# Patient Record
Sex: MALE | Race: BLACK OR AFRICAN AMERICAN
[De-identification: names, ages, dates, MRNs, and addresses within clinical notes are randomized per-mention and may not be internally consistent; named-entity substitution may affect disease eponyms.]

---

## 2018-05-12 ENCOUNTER — HOSPITAL ENCOUNTER (INPATIENT)
Dept: HOSPITAL 62 - ER | Age: 48
LOS: 6 days | Discharge: HOME | DRG: 872 | End: 2018-05-18
Attending: INTERNAL MEDICINE | Admitting: INTERNAL MEDICINE
Payer: SELF-PAY

## 2018-05-12 DIAGNOSIS — E86.9: ICD-10-CM

## 2018-05-12 DIAGNOSIS — E87.6: ICD-10-CM

## 2018-05-12 DIAGNOSIS — D72.825: ICD-10-CM

## 2018-05-12 DIAGNOSIS — K52.9: ICD-10-CM

## 2018-05-12 DIAGNOSIS — E66.9: ICD-10-CM

## 2018-05-12 DIAGNOSIS — E87.3: ICD-10-CM

## 2018-05-12 DIAGNOSIS — I16.9: ICD-10-CM

## 2018-05-12 DIAGNOSIS — Z82.49: ICD-10-CM

## 2018-05-12 DIAGNOSIS — E86.0: ICD-10-CM

## 2018-05-12 DIAGNOSIS — R31.9: ICD-10-CM

## 2018-05-12 DIAGNOSIS — D50.9: ICD-10-CM

## 2018-05-12 DIAGNOSIS — E87.1: ICD-10-CM

## 2018-05-12 DIAGNOSIS — D72.829: ICD-10-CM

## 2018-05-12 DIAGNOSIS — A41.9: Primary | ICD-10-CM

## 2018-05-12 PROCEDURE — 83605 ASSAY OF LACTIC ACID: CPT

## 2018-05-12 PROCEDURE — 83735 ASSAY OF MAGNESIUM: CPT

## 2018-05-12 PROCEDURE — 80048 BASIC METABOLIC PNL TOTAL CA: CPT

## 2018-05-12 PROCEDURE — 83880 ASSAY OF NATRIURETIC PEPTIDE: CPT

## 2018-05-12 PROCEDURE — 96375 TX/PRO/DX INJ NEW DRUG ADDON: CPT

## 2018-05-12 PROCEDURE — 80053 COMPREHEN METABOLIC PANEL: CPT

## 2018-05-12 PROCEDURE — 93005 ELECTROCARDIOGRAM TRACING: CPT

## 2018-05-12 PROCEDURE — S0028 INJECTION, FAMOTIDINE, 20 MG: HCPCS

## 2018-05-12 PROCEDURE — 96361 HYDRATE IV INFUSION ADD-ON: CPT

## 2018-05-12 PROCEDURE — 93010 ELECTROCARDIOGRAM REPORT: CPT

## 2018-05-12 PROCEDURE — 80069 RENAL FUNCTION PANEL: CPT

## 2018-05-12 PROCEDURE — 85025 COMPLETE CBC W/AUTO DIFF WBC: CPT

## 2018-05-12 PROCEDURE — 81001 URINALYSIS AUTO W/SCOPE: CPT

## 2018-05-12 PROCEDURE — 87205 SMEAR GRAM STAIN: CPT

## 2018-05-12 PROCEDURE — 84443 ASSAY THYROID STIM HORMONE: CPT

## 2018-05-12 PROCEDURE — 87045 FECES CULTURE AEROBIC BACT: CPT

## 2018-05-12 PROCEDURE — 96365 THER/PROPH/DIAG IV INF INIT: CPT

## 2018-05-12 PROCEDURE — 82803 BLOOD GASES ANY COMBINATION: CPT

## 2018-05-12 PROCEDURE — 87493 C DIFF AMPLIFIED PROBE: CPT

## 2018-05-12 PROCEDURE — 36415 COLL VENOUS BLD VENIPUNCTURE: CPT

## 2018-05-12 PROCEDURE — 84100 ASSAY OF PHOSPHORUS: CPT

## 2018-05-12 PROCEDURE — 99285 EMERGENCY DEPT VISIT HI MDM: CPT

## 2018-05-12 PROCEDURE — 83690 ASSAY OF LIPASE: CPT

## 2018-05-12 PROCEDURE — 80202 ASSAY OF VANCOMYCIN: CPT

## 2018-05-12 PROCEDURE — 83540 ASSAY OF IRON: CPT

## 2018-05-12 PROCEDURE — 74019 RADEX ABDOMEN 2 VIEWS: CPT

## 2018-05-12 PROCEDURE — 87040 BLOOD CULTURE FOR BACTERIA: CPT

## 2018-05-13 LAB
%HYPO/RBC NFR BLD AUTO: (no result) %
%HYPO/RBC NFR BLD AUTO: SLIGHT %
ABSOLUTE LYMPHOCYTES# (MANUAL): 1.1 10^3/UL (ref 0.5–4.7)
ABSOLUTE LYMPHOCYTES# (MANUAL): 2.3 10^3/UL (ref 0.5–4.7)
ABSOLUTE MONOCYTES # (MANUAL): 0.2 10^3/UL (ref 0.1–1.4)
ABSOLUTE MONOCYTES # (MANUAL): 0.6 10^3/UL (ref 0.1–1.4)
ABSOLUTE NEUTROPHILS# (MANUAL): 12.3 10^3/UL (ref 1.7–8.2)
ABSOLUTE NEUTROPHILS# (MANUAL): 13.9 10^3/UL (ref 1.7–8.2)
ADD MANUAL DIFF: YES
ADD MANUAL DIFF: YES
ALBUMIN SERPL-MCNC: 3.4 G/DL (ref 3.5–5)
ALBUMIN SERPL-MCNC: 3.7 G/DL (ref 3.5–5)
ALP SERPL-CCNC: 74 U/L (ref 38–126)
ALP SERPL-CCNC: 83 U/L (ref 38–126)
ALT SERPL-CCNC: 100 U/L (ref 21–72)
ALT SERPL-CCNC: 87 U/L (ref 21–72)
ANION GAP SERPL CALC-SCNC: 10 MMOL/L (ref 5–19)
ANION GAP SERPL CALC-SCNC: 12 MMOL/L (ref 5–19)
ANION GAP SERPL CALC-SCNC: 17 MMOL/L (ref 5–19)
ANISOCYTOSIS BLD QL SMEAR: (no result)
ANISOCYTOSIS BLD QL SMEAR: SLIGHT
APPEARANCE UR: (no result)
APTT PPP: YELLOW S
AST SERPL-CCNC: 225 U/L (ref 17–59)
AST SERPL-CCNC: 262 U/L (ref 17–59)
BASE EXCESS BLDV CALC-SCNC: 9 MMOL/L
BASOPHILS NFR BLD MANUAL: 0 % (ref 0–2)
BASOPHILS NFR BLD MANUAL: 0 % (ref 0–2)
BILIRUB DIRECT SERPL-MCNC: 0.7 MG/DL (ref 0–0.4)
BILIRUB DIRECT SERPL-MCNC: 0.7 MG/DL (ref 0–0.4)
BILIRUB SERPL-MCNC: 0.9 MG/DL (ref 0.2–1.3)
BILIRUB SERPL-MCNC: 1.1 MG/DL (ref 0.2–1.3)
BILIRUB UR QL STRIP: NEGATIVE
BUN SERPL-MCNC: 12 MG/DL (ref 7–20)
CALCIUM: 7.8 MG/DL (ref 8.4–10.2)
CALCIUM: 8.2 MG/DL (ref 8.4–10.2)
CALCIUM: 8.4 MG/DL (ref 8.4–10.2)
CHLORIDE SERPL-SCNC: 82 MMOL/L (ref 98–107)
CHLORIDE SERPL-SCNC: 92 MMOL/L (ref 98–107)
CHLORIDE SERPL-SCNC: 98 MMOL/L (ref 98–107)
CO2 SERPL-SCNC: 31 MMOL/L (ref 22–30)
CO2 SERPL-SCNC: 32 MMOL/L (ref 22–30)
CO2 SERPL-SCNC: 32 MMOL/L (ref 22–30)
DOHLE BOD BLD QL SMEAR: PRESENT
EOSINOPHIL NFR BLD MANUAL: 0 % (ref 0–6)
EOSINOPHIL NFR BLD MANUAL: 0 % (ref 0–6)
ERYTHROCYTE [DISTWIDTH] IN BLOOD BY AUTOMATED COUNT: 15.5 % (ref 11.5–14)
ERYTHROCYTE [DISTWIDTH] IN BLOOD BY AUTOMATED COUNT: 15.6 % (ref 11.5–14)
GLUCOSE SERPL-MCNC: 107 MG/DL (ref 75–110)
GLUCOSE SERPL-MCNC: 121 MG/DL (ref 75–110)
GLUCOSE SERPL-MCNC: 132 MG/DL (ref 75–110)
GLUCOSE UR STRIP-MCNC: NEGATIVE MG/DL
HCO3 BLDV-SCNC: 33.4 MMOL/L (ref 20–32)
HCT VFR BLD CALC: 33.7 % (ref 37.9–51)
HCT VFR BLD CALC: 36.1 % (ref 37.9–51)
HGB BLD-MCNC: 11 G/DL (ref 13.5–17)
HGB BLD-MCNC: 11.6 G/DL (ref 13.5–17)
KETONES UR STRIP-MCNC: (no result) MG/DL
LIPASE SERPL-CCNC: 90.3 U/L (ref 23–300)
MCH RBC QN AUTO: 20.3 PG (ref 27–33.4)
MCH RBC QN AUTO: 20.5 PG (ref 27–33.4)
MCHC RBC AUTO-ENTMCNC: 32.1 G/DL (ref 32–36)
MCHC RBC AUTO-ENTMCNC: 32.6 G/DL (ref 32–36)
MCV RBC AUTO: 63 FL (ref 80–97)
MCV RBC AUTO: 63 FL (ref 80–97)
METAMYELOCYTES % (MANUAL): 2 %
MONOCYTES % (MANUAL): 1 % (ref 3–13)
MONOCYTES % (MANUAL): 4 % (ref 3–13)
NEUTS BAND NFR BLD MANUAL: 19 % (ref 3–5)
NEUTS BAND NFR BLD MANUAL: 26 % (ref 3–5)
NITRITE UR QL STRIP: NEGATIVE
PCO2 BLDV: 44.8 MMHG (ref 35–63)
PH BLDV: 7.49 [PH] (ref 7.3–7.42)
PH UR STRIP: 6 [PH] (ref 5–9)
PHOSPHATE SERPL-MCNC: 2.2 MG/DL (ref 2.5–4.5)
PLATELET # BLD: 150 10^3/UL (ref 150–450)
PLATELET # BLD: 157 10^3/UL (ref 150–450)
PLATELET COMMENT: ADEQUATE
PLATELET COMMENT: ADEQUATE
PLATELET LARGE: PRESENT
POIKILOCYTOSIS BLD QL SMEAR: SLIGHT
POIKILOCYTOSIS BLD QL SMEAR: SLIGHT
POTASSIUM SERPL-SCNC: 3 MMOL/L (ref 3.6–5)
POTASSIUM SERPL-SCNC: 3.3 MMOL/L (ref 3.6–5)
POTASSIUM SERPL-SCNC: 3.7 MMOL/L (ref 3.6–5)
PROT SERPL-MCNC: 7.3 G/DL (ref 6.3–8.2)
PROT SERPL-MCNC: 7.5 G/DL (ref 6.3–8.2)
PROT UR STRIP-MCNC: 100 MG/DL
RBC # BLD AUTO: 5.34 10^6/UL (ref 4.35–5.55)
RBC # BLD AUTO: 5.7 10^6/UL (ref 4.35–5.55)
SCHISTOCYTES BLD QL SMEAR: SLIGHT
SEGMENTED NEUTROPHILS % (MAN): 57 % (ref 42–78)
SEGMENTED NEUTROPHILS % (MAN): 69 % (ref 42–78)
SODIUM SERPL-SCNC: 129.7 MMOL/L (ref 137–145)
SODIUM SERPL-SCNC: 135.5 MMOL/L (ref 137–145)
SODIUM SERPL-SCNC: 140 MMOL/L (ref 137–145)
SP GR UR STRIP: 1.01
TARGETS BLD QL SMEAR: SLIGHT
TOTAL CELLS COUNTED BLD: 100
TOTAL CELLS COUNTED BLD: 100
TOXIC GRANULES BLD QL SMEAR: SLIGHT
UROBILINOGEN UR-MCNC: NEGATIVE MG/DL (ref ?–2)
VARIANT LYMPHS NFR BLD MANUAL: 13 % (ref 13–45)
VARIANT LYMPHS NFR BLD MANUAL: 8 % (ref 13–45)
WBC # BLD AUTO: 14 10^3/UL (ref 4–10.5)
WBC # BLD AUTO: 16.3 10^3/UL (ref 4–10.5)
WBC TOXIC VACUOLES BLD QL SMEAR: PRESENT
WBC TOXIC VACUOLES BLD QL SMEAR: PRESENT

## 2018-05-13 RX ADMIN — POTASSIUM CHLORIDE SCH ML: 29.8 INJECTION, SOLUTION INTRAVENOUS at 07:53

## 2018-05-13 RX ADMIN — METRONIDAZOLE SCH MG: 500 TABLET ORAL at 17:12

## 2018-05-13 RX ADMIN — POTASSIUM CHLORIDE SCH ML: 29.8 INJECTION, SOLUTION INTRAVENOUS at 12:01

## 2018-05-13 RX ADMIN — HEPARIN SODIUM SCH UNIT: 5000 INJECTION, SOLUTION INTRAVENOUS; SUBCUTANEOUS at 15:30

## 2018-05-13 RX ADMIN — METRONIDAZOLE SCH MG: 500 TABLET ORAL at 06:27

## 2018-05-13 RX ADMIN — MAGNESIUM SULFATE IN DEXTROSE SCH ML: 10 INJECTION, SOLUTION INTRAVENOUS at 05:00

## 2018-05-13 RX ADMIN — MAGNESIUM SULFATE IN DEXTROSE SCH ML: 10 INJECTION, SOLUTION INTRAVENOUS at 03:35

## 2018-05-13 RX ADMIN — CIPROFLOXACIN SCH ML: 2 INJECTION, SOLUTION INTRAVENOUS at 21:16

## 2018-05-13 RX ADMIN — SODIUM CHLORIDE SCH ML: 9 INJECTION, SOLUTION INTRAVENOUS at 07:54

## 2018-05-13 RX ADMIN — MAGNESIUM SULFATE IN DEXTROSE SCH ML: 10 INJECTION, SOLUTION INTRAVENOUS at 09:26

## 2018-05-13 RX ADMIN — IBUPROFEN PRN MG: 400 TABLET ORAL at 12:18

## 2018-05-13 RX ADMIN — HEPARIN SODIUM SCH UNIT: 5000 INJECTION, SOLUTION INTRAVENOUS; SUBCUTANEOUS at 06:34

## 2018-05-13 RX ADMIN — ACETAMINOPHEN PRN MG: 325 TABLET ORAL at 11:05

## 2018-05-13 RX ADMIN — POTASSIUM CHLORIDE SCH ML: 29.8 INJECTION, SOLUTION INTRAVENOUS at 05:01

## 2018-05-13 RX ADMIN — FAMOTIDINE SCH MG: 10 INJECTION INTRAVENOUS at 21:16

## 2018-05-13 RX ADMIN — SODIUM CHLORIDE PRN ML: 9 INJECTION, SOLUTION INTRAVENOUS at 23:31

## 2018-05-13 RX ADMIN — ENALAPRILAT PRN MG: 1.25 INJECTION, SOLUTION INTRAVENOUS at 06:27

## 2018-05-13 RX ADMIN — POTASSIUM CHLORIDE SCH ML: 29.8 INJECTION, SOLUTION INTRAVENOUS at 08:00

## 2018-05-13 RX ADMIN — HEPARIN SODIUM SCH UNIT: 5000 INJECTION, SOLUTION INTRAVENOUS; SUBCUTANEOUS at 21:16

## 2018-05-13 RX ADMIN — ENALAPRILAT PRN MG: 1.25 INJECTION, SOLUTION INTRAVENOUS at 23:35

## 2018-05-13 RX ADMIN — SODIUM CHLORIDE SCH ML: 9 INJECTION, SOLUTION INTRAVENOUS at 13:10

## 2018-05-13 RX ADMIN — MAGNESIUM SULFATE IN DEXTROSE SCH ML: 10 INJECTION, SOLUTION INTRAVENOUS at 11:43

## 2018-05-13 RX ADMIN — METRONIDAZOLE SCH MG: 500 TABLET ORAL at 23:30

## 2018-05-13 RX ADMIN — SODIUM CHLORIDE SCH ML: 9 INJECTION, SOLUTION INTRAVENOUS at 05:01

## 2018-05-13 RX ADMIN — HYDRALAZINE HYDROCHLORIDE PRN MG: 20 INJECTION INTRAMUSCULAR; INTRAVENOUS at 20:21

## 2018-05-13 RX ADMIN — METRONIDAZOLE SCH MG: 500 TABLET ORAL at 12:03

## 2018-05-13 RX ADMIN — CIPROFLOXACIN SCH ML: 2 INJECTION, SOLUTION INTRAVENOUS at 10:51

## 2018-05-13 RX ADMIN — SODIUM CHLORIDE PRN ML: 9 INJECTION, SOLUTION INTRAVENOUS at 15:44

## 2018-05-13 RX ADMIN — HYDRALAZINE HYDROCHLORIDE PRN MG: 20 INJECTION INTRAMUSCULAR; INTRAVENOUS at 10:51

## 2018-05-13 NOTE — EKG REPORT
SEVERITY:- ABNORMAL ECG -

SINUS TACHYCARDIA

PROBABLE LEFT ATRIAL ABNORMALITY

INCOMPLETE RIGHT BUNDLE BRANCH BLOCK

PROLONG QT.

:

Confirmed by: Doni Cole MD 13-May-2018 07:39:01

## 2018-05-13 NOTE — ER DOCUMENT REPORT
ED General





- General


Chief Complaint: Nausea/Vomiting/Diarrhea


Stated Complaint: NOT FEELING GOOD


Time Seen by Provider: 05/12/18 23:21


Notes: 





Patient is a 47-year-old male without chronic medical problems, no prior 

surgical history who presents with 4 days of nausea, vomiting and diarrhea.  

The patient reports that he is only been able to tolerate small amount of water 

for the past 4 days but has been unable to tolerate any additional fluid intake 

or food intake.  He notes that he vomits anytime even at the smell of food.  He 

also notes persistent, watery diarrhea.  Patient was unaware that he has been 

having fevers but states that he had did feel quite hot at home.  No known sick 

contacts.  He denies a history of similar symptoms in the past.  He has not 

seen his primary care doctor regarding today's concerns.  He denies any 

abdominal pain at time of presentation but notes that he intermittently has 

some abdominal cramping which is mild in nature around episodes of diarrhea 

that resolves after he has diarrhea.  He denies any recent antibiotic use.  No 

bloody diarrhea.


TRAVEL OUTSIDE OF THE [place] IN LAST 30 DAYS: No





- Related Data


Allergies/Adverse Reactions: 


 





No Known Allergies Allergy (Verified 05/12/18 23:01)


 











Past Medical History





- General


Information source: Patient





- Social History


Smoking Status: Never Smoker


Frequency of alcohol use: None


Drug Abuse: None


Lives with: Family


Family History: Reviewed & Not Pertinent





Review of Systems





- Review of Systems


Notes: 





Constitutional: Positive for fever.


HENT: Negative for sore throat.


Eyes: Negative for visual changes.


Cardiovascular: Negative for chest pain.


Respiratory: Negative for shortness of breath.


Gastrointestinal: Negative for abdominal pain, positive for vomiting and 

diarrhea


Genitourinary: Negative for dysuria.


Musculoskeletal: Negative for back pain.


Skin: Negative for rash.


Neurological: Negative for headaches, weakness or numbness.





10 point ROS negative except as marked above and in HPI.





Physical Exam





- Vital signs


Vitals: 


 











Temp Pulse Resp BP Pulse Ox


 


 103.2 F H  102 H  20   200/108 H  95 


 


 05/12/18 23:09  05/12/18 23:09  05/12/18 23:09  05/12/18 23:09  05/12/18 23:09











Interpretation: Hypertensive, Tachycardic, Febrile


Notes: 





PHYSICAL EXAMINATION:





GENERAL: Appears moderately unwell but in no acute distress





HEAD: Atraumatic, normocephalic.





EYES: Pupils equal round and reactive to light, extraocular movements intact, 

sclera anicteric, conjunctiva are normal.





ENT: nares patent, oropharynx clear without exudates.  Very dry mucous 

membranes.





NECK: Normal range of motion, supple without lymphadenopathy





LUNGS: Breath sounds clear to auscultation bilaterally and equal.  No wheezes 

rales or rhonchi.





HEART: Regular tachycardia without murmurs





ABDOMEN: Soft, nontender, normoactive bowel sounds.  No guarding, no rebound.  

No masses appreciated.





EXTREMITIES: Normal range of motion, no pitting or edema.  No cyanosis.





NEUROLOGICAL: No focal neurological deficits. Moves all extremities 

spontaneously and on command.





PSYCH: Normal mood, normal affect.





SKIN: Warm, Dry, normal turgor, no rashes or lesions noted.





Course





- Re-evaluation


Re-evalutation: 





05/13/18 00:12


Patient presents with 4 days of nausea, vomiting and diarrhea but denies any 

abdominal pain.  Patient was febrile in triage with mild tachycardia.  On 

examination he is markedly dehydrated.  His abdominal exam is otherwise benign 

without any focal areas of tenderness, rebound or guarding.  Given the absence 

of any abdominal pain I doubt an acute diverticulitis, acute appendicitis, 

biliary pathology or acute pancreatitis.  Patient denies any dysuria and has no 

CVA tenderness to suggest nephrolithiasis or pyelonephritis.  Will obtain labs, 

provide IV fluids, antiemetics and reassess the patient.  I suspect likely an 

infectious colitis that required antibiotic treatment given that the patient is 

febrile and has been having ongoing symptoms for almost 1 week at this time 

point.


05/13/18 02:30


Labs show a leukocytosis with a markedly elevated bandemia.  Also shows 

hyponatremia and hypokalemia.  Venous blood gas shows that the patient has a 

metabolic alkalosis.  This is very consistent with patient's clinical history 

of persistent vomiting and diarrhea.  Repeat abdominal examination does not 

show any areas of localized tenderness, rebound or guarding.  I discussed with 

the hospitalist for admission.  The patient has been started on IV 

ciprofloxacin and IV metronidazole.





- Vital Signs


Vital signs: 


 











Temp Pulse Resp BP Pulse Ox


 


 103.2 F H  102 H  20   200/108 H  95 


 


 05/12/18 23:09  05/12/18 23:09  05/12/18 23:09  05/12/18 23:09  05/12/18 23:09














- Laboratory


Result Diagrams: 


 05/12/18 23:35





 05/12/18 23:35


Laboratory results interpreted by me: 


 











  05/12/18 05/12/18 05/12/18





  00:37 23:35 23:35


 


WBC   16.3 H 


 


RBC   5.70 H 


 


Hgb   11.6 L 


 


Hct   36.1 L 


 


MCV   63 L 


 


MCH   20.3 L 


 


RDW   15.6 H 


 


Band Neutrophils %   26 H 


 


Monocytes % (Manual)   1 L 


 


Metamyelocytes %   2 H 


 


Abs Neuts (Manual)   13.9 H 


 


VBG pH   


 


VBG HCO3   


 


Sodium    129.7 L


 


Potassium    3.3 L


 


Chloride    82 L


 


Carbon Dioxide    31 H


 


Creatinine    1.32 H


 


Est GFR (Non-Af Amer)    58 L


 


Glucose    121 H


 


Direct Bilirubin    0.7 H


 


AST    262 H


 


ALT    100 H


 


Urine Protein  100 H  


 


Urine Ketones  TRACE H  


 


Urine Blood  LARGE H  














  05/12/18





  23:35


 


WBC 


 


RBC 


 


Hgb 


 


Hct 


 


MCV 


 


MCH 


 


RDW 


 


Band Neutrophils % 


 


Monocytes % (Manual) 


 


Metamyelocytes % 


 


Abs Neuts (Manual) 


 


VBG pH  7.49 H


 


VBG HCO3  33.4 H


 


Sodium 


 


Potassium 


 


Chloride 


 


Carbon Dioxide 


 


Creatinine 


 


Est GFR (Non-Af Amer) 


 


Glucose 


 


Direct Bilirubin 


 


AST 


 


ALT 


 


Urine Protein 


 


Urine Ketones 


 


Urine Blood 














Discharge





- Discharge


Clinical Impression: 


 Enterocolitis, Dehydration, Bandemia





Sepsis


Qualifiers:


 Sepsis type: sepsis due to unspecified organism Qualified Code(s): A41.9 - 

Sepsis, unspecified organism





Condition: Fair


Disposition: ADMITTED AS INPATIENT


Admitting Provider: Hospitalist


Unit Admitted: Telemetry

## 2018-05-13 NOTE — RADIOLOGY REPORT (SQ)
EXAM DESCRIPTION:

XR ABDOMEN 2 VIEWS SUPINE ERECT



CLINICAL HISTORY:

47 years Male, persistent vomiting, eval obstruction



COMPARISON:

None.



NUMBER OF VIEWS/TECHNIQUE:

2, three images



FINDINGS:



Scattered small intestinal air-fluid levels with diameter of up

to 2.6 cm.. No suspicious calcification. Grossly intact skeletal

structures. 



IMPRESSION:



Small bowel ileus or low-grade obstruction.

## 2018-05-14 LAB — PATH REV BLD -IMP: (no result)

## 2018-05-14 RX ADMIN — METOPROLOL TARTRATE SCH MG: 100 TABLET, FILM COATED ORAL at 21:00

## 2018-05-14 RX ADMIN — METRONIDAZOLE SCH MG: 500 TABLET ORAL at 05:09

## 2018-05-14 RX ADMIN — HEPARIN SODIUM SCH UNIT: 5000 INJECTION, SOLUTION INTRAVENOUS; SUBCUTANEOUS at 13:16

## 2018-05-14 RX ADMIN — HYDRALAZINE HYDROCHLORIDE PRN MG: 20 INJECTION INTRAMUSCULAR; INTRAVENOUS at 20:32

## 2018-05-14 RX ADMIN — CIPROFLOXACIN SCH ML: 2 INJECTION, SOLUTION INTRAVENOUS at 10:55

## 2018-05-14 RX ADMIN — SODIUM CHLORIDE PRN ML: 9 INJECTION, SOLUTION INTRAVENOUS at 10:55

## 2018-05-14 RX ADMIN — HEPARIN SODIUM SCH UNIT: 5000 INJECTION, SOLUTION INTRAVENOUS; SUBCUTANEOUS at 21:00

## 2018-05-14 RX ADMIN — FAMOTIDINE SCH MG: 10 INJECTION INTRAVENOUS at 10:54

## 2018-05-14 RX ADMIN — HEPARIN SODIUM SCH UNIT: 5000 INJECTION, SOLUTION INTRAVENOUS; SUBCUTANEOUS at 05:11

## 2018-05-14 RX ADMIN — ACETAMINOPHEN PRN MG: 325 TABLET ORAL at 17:07

## 2018-05-14 RX ADMIN — FAMOTIDINE SCH MG: 20 TABLET, FILM COATED ORAL at 21:00

## 2018-05-14 RX ADMIN — ACETAMINOPHEN PRN MG: 325 TABLET ORAL at 23:05

## 2018-05-14 RX ADMIN — CIPROFLOXACIN HYDROCHLORIDE SCH MG: 500 TABLET, FILM COATED ORAL at 21:00

## 2018-05-14 RX ADMIN — ACETAMINOPHEN PRN MG: 325 TABLET ORAL at 05:06

## 2018-05-15 LAB
ABSOLUTE LYMPHOCYTES# (MANUAL): 1.1 10^3/UL (ref 0.5–4.7)
ABSOLUTE MONOCYTES # (MANUAL): 0.4 10^3/UL (ref 0.1–1.4)
ABSOLUTE NEUTROPHILS# (MANUAL): 8.6 10^3/UL (ref 1.7–8.2)
ADD MANUAL DIFF: YES
ALBUMIN SERPL-MCNC: 3.2 G/DL (ref 3.5–5)
ANION GAP SERPL CALC-SCNC: 15 MMOL/L (ref 5–19)
ANISOCYTOSIS BLD QL SMEAR: SLIGHT
BASOPHILS NFR BLD MANUAL: 0 % (ref 0–2)
BUN SERPL-MCNC: 13 MG/DL (ref 7–20)
CALCIUM: 8.5 MG/DL (ref 8.4–10.2)
CHLORIDE SERPL-SCNC: 98 MMOL/L (ref 98–107)
CO2 SERPL-SCNC: 29 MMOL/L (ref 22–30)
EOSINOPHIL NFR BLD MANUAL: 1 % (ref 0–6)
ERYTHROCYTE [DISTWIDTH] IN BLOOD BY AUTOMATED COUNT: 15.7 % (ref 11.5–14)
GLUCOSE SERPL-MCNC: 110 MG/DL (ref 75–110)
HCT VFR BLD CALC: 33.8 % (ref 37.9–51)
HGB BLD-MCNC: 11.1 G/DL (ref 13.5–17)
IRON SERPL-MCNC: 28.9 UG/DL (ref 49–181)
MCH RBC QN AUTO: 20.8 PG (ref 27–33.4)
MCHC RBC AUTO-ENTMCNC: 32.9 G/DL (ref 32–36)
MCV RBC AUTO: 63 FL (ref 80–97)
MONOCYTES % (MANUAL): 4 % (ref 3–13)
NEUTS BAND NFR BLD MANUAL: 6 % (ref 3–5)
PHOSPHATE SERPL-MCNC: 3.1 MG/DL (ref 2.5–4.5)
PLATELET # BLD: 223 10^3/UL (ref 150–450)
PLATELET COMMENT: ADEQUATE
POTASSIUM SERPL-SCNC: 3 MMOL/L (ref 3.6–5)
RBC # BLD AUTO: 5.36 10^6/UL (ref 4.35–5.55)
SEGMENTED NEUTROPHILS % (MAN): 78 % (ref 42–78)
SODIUM SERPL-SCNC: 141.5 MMOL/L (ref 137–145)
TOTAL CELLS COUNTED BLD: 100
TOXIC GRANULES BLD QL SMEAR: SLIGHT
VARIANT LYMPHS NFR BLD MANUAL: 11 % (ref 13–45)
WBC # BLD AUTO: 10.2 10^3/UL (ref 4–10.5)
WBC TOXIC VACUOLES BLD QL SMEAR: PRESENT

## 2018-05-15 RX ADMIN — HEPARIN SODIUM SCH UNIT: 5000 INJECTION, SOLUTION INTRAVENOUS; SUBCUTANEOUS at 06:44

## 2018-05-15 RX ADMIN — ACETAMINOPHEN PRN MG: 325 TABLET ORAL at 20:23

## 2018-05-15 RX ADMIN — METOPROLOL TARTRATE SCH MG: 100 TABLET, FILM COATED ORAL at 11:06

## 2018-05-15 RX ADMIN — CLONIDINE HYDROCHLORIDE SCH MG: 0.1 TABLET ORAL at 17:36

## 2018-05-15 RX ADMIN — IRON SUCROSE SCH MG: 20 INJECTION, SOLUTION INTRAVENOUS at 11:06

## 2018-05-15 RX ADMIN — AMPICILLIN SODIUM AND SULBACTAM SODIUM SCH GM: 2; 1 INJECTION, POWDER, FOR SOLUTION INTRAMUSCULAR; INTRAVENOUS at 21:28

## 2018-05-15 RX ADMIN — AMLODIPINE BESYLATE SCH MG: 10 TABLET ORAL at 11:05

## 2018-05-15 RX ADMIN — FAMOTIDINE SCH MG: 20 TABLET, FILM COATED ORAL at 21:32

## 2018-05-15 RX ADMIN — METOPROLOL TARTRATE SCH MG: 100 TABLET, FILM COATED ORAL at 21:32

## 2018-05-15 RX ADMIN — IBUPROFEN PRN MG: 400 TABLET ORAL at 03:50

## 2018-05-15 RX ADMIN — FAMOTIDINE SCH MG: 20 TABLET, FILM COATED ORAL at 11:06

## 2018-05-15 RX ADMIN — VANCOMYCIN HYDROCHLORIDE SCH MG: 1 INJECTION, POWDER, LYOPHILIZED, FOR SOLUTION INTRAVENOUS at 15:29

## 2018-05-15 RX ADMIN — CIPROFLOXACIN HYDROCHLORIDE SCH MG: 500 TABLET, FILM COATED ORAL at 11:06

## 2018-05-15 RX ADMIN — HEPARIN SODIUM SCH UNIT: 5000 INJECTION, SOLUTION INTRAVENOUS; SUBCUTANEOUS at 15:29

## 2018-05-15 RX ADMIN — HYDRALAZINE HYDROCHLORIDE PRN MG: 20 INJECTION INTRAMUSCULAR; INTRAVENOUS at 12:06

## 2018-05-15 RX ADMIN — LISINOPRIL SCH MG: 10 TABLET ORAL at 11:05

## 2018-05-15 RX ADMIN — HEPARIN SODIUM SCH UNIT: 5000 INJECTION, SOLUTION INTRAVENOUS; SUBCUTANEOUS at 21:32

## 2018-05-15 RX ADMIN — VANCOMYCIN HYDROCHLORIDE SCH MG: 1 INJECTION, POWDER, LYOPHILIZED, FOR SOLUTION INTRAVENOUS at 22:39

## 2018-05-15 NOTE — PDOC PROGRESS REPORT
Subjective


Progress Note for:: 05/15/18


Subjective:: 





Still having fevers.  Blood pressure remains elevated.  Appetite remains good.  

Diarrhea about the same as yesterday, improved from admission but still 

present.  Denies a history of hematemesis or stomach problems.  There is never 

seen blood in his stool.  Denies weight loss or night sweats.  Denies abdominal 

pain.


Reason For Visit: 


ABD PAIN ILEUS,GASTROENTRITIS,HTN URGENCY








Physical Exam


Vital Signs: 


 











Temp Pulse Resp BP Pulse Ox


 


 98.4 F   74   18   179/106 H  97 


 


 05/15/18 11:50  05/15/18 11:50  05/15/18 11:50  05/15/18 11:50  05/15/18 11:50








 Intake & Output











 05/14/18 05/15/18 05/16/18





 05:59 05:59 05:59


 


Intake Total 975 3042 1175


 


Output Total  2475 2150


 


Balance 975 567 -975


 


Weight 244 lb 0.827 oz 240 lb 11.916 oz 











General appearance: PRESENT: no acute distress, obese


Respiratory exam: PRESENT: clear to auscultation renetta


Cardiovascular exam: PRESENT: RRR


GI/Abdominal exam: PRESENT: soft.  ABSENT: tenderness


Extremities exam: ABSENT: +1 edema


Musculoskeletal exam: PRESENT: normal inspection


Neurological exam: PRESENT: alert


Psychiatric exam: PRESENT: appropriate affect


Skin exam: PRESENT: dry, warm, other - Keloids on his chest since she was a 

teenager





Results


Laboratory Results: 


 





 05/15/18 05:48 





 05/15/18 05:48 





 











  05/15/18 05/15/18 05/15/18





  05:48 05:48 05:48


 


WBC  10.2  


 


RBC  5.36  


 


Hgb  11.1 L  


 


Hct  33.8 L  


 


MCV  63 L  


 


MCH  20.8 L  


 


MCHC  32.9  


 


RDW  15.7 H  


 


Plt Count  223  


 


Seg Neutrophils %  Not Reportable  


 


Lymphocytes %  Not Reportable  


 


Monocytes %  Not Reportable  


 


Eosinophils %  Not Reportable  


 


Basophils %  Not Reportable  


 


Absolute Neutrophils  Not Reportable  


 


Absolute Lymphocytes  Not Reportable  


 


Absolute Monocytes  Not Reportable  


 


Absolute Eosinophils  Not Reportable  


 


Absolute Basophils  Not Reportable  


 


Sodium   141.5 


 


Potassium   3.0 L* 


 


Chloride   98 


 


Carbon Dioxide   29 


 


Anion Gap   15 


 


BUN   13 


 


Creatinine   1.06 


 


Est GFR ( Amer)   > 60 


 


Est GFR (Non-Af Amer)   > 60 


 


Glucose   110 


 


Calcium   8.5 


 


Phosphorus   3.1 


 


Magnesium   2.3 


 


Iron   28.9 L 


 


Albumin   3.2 L 


 


TSH    0.87








 





05/13/18 08:25   Stool - Stool    - Final





 











  05/15/18





  05:48


 


NT-Pro-B Natriuret Pep  583 H











Impressions: 


 





Abdomen X-Ray  05/13/18 00:07


IMPRESSION:


 


Small bowel ileus or low-grade obstruction.


 














Assessment & Plan





- Diagnosis


(1) Enterocolitis


Is this a current diagnosis for this admission?: Yes   


Plan: 


C. difficile was negative.  I am concerned there may be something else going on 

here.  Partially because his fevers have not improved, and partially because of 

his unexplained microcytic anemia.  At some point he will need a colonoscopy to 

further investigate his anemia, but I would prefer to do this as an outpatient 

after his acute episode has resolved.








(2) HTN (hypertension)


Qualifiers: 


   Hypertension type: essential hypertension   Qualified Code(s): I10 - 

Essential (primary) hypertension   


Is this a current diagnosis for this admission?: Yes   


Plan: 


Not on any home medication.  Quite severe.  Continue to titrate 

antihypertensives for optimal control.








(3) Obesity


Qualifiers: 


   Obesity type: due to excess calories   Body mass index: BMI 34.0-34.9 


Is this a current diagnosis for this admission?: Yes   





(4) Sepsis


Qualifiers: 


   Sepsis type: sepsis due to unspecified organism   Qualified Code(s): A41.9 - 

Sepsis, unspecified organism   


Is this a current diagnosis for this admission?: Yes   


Plan: 


Presumably the same organism as his colitis.  Cultures are negative thus far.  

Fevers have not improved.  Diarrhea essentially unchanged.  I will empirically 

broaden coverage until his fevers resolve.








(5) Microcytic anemia


Is this a current diagnosis for this admission?: Yes   


Plan: 


Unusual in a man this age.  Guaiac stool.  Iron deficient: Replace.  Will need 

a colonoscopy at some point, though probably not as an inpatient.

## 2018-05-16 LAB
%HYPO/RBC NFR BLD AUTO: (no result) %
ABSOLUTE LYMPHOCYTES# (MANUAL): 1.2 10^3/UL (ref 0.5–4.7)
ABSOLUTE MONOCYTES # (MANUAL): 0.2 10^3/UL (ref 0.1–1.4)
ABSOLUTE NEUTROPHILS# (MANUAL): 5.8 10^3/UL (ref 1.7–8.2)
ADD MANUAL DIFF: YES
ALBUMIN SERPL-MCNC: 3 G/DL (ref 3.5–5)
ANION GAP SERPL CALC-SCNC: 10 MMOL/L (ref 5–19)
ANISOCYTOSIS BLD QL SMEAR: (no result)
BASOPHILS NFR BLD MANUAL: 0 % (ref 0–2)
BUN SERPL-MCNC: 13 MG/DL (ref 7–20)
CALCIUM: 8.2 MG/DL (ref 8.4–10.2)
CHLORIDE SERPL-SCNC: 97 MMOL/L (ref 98–107)
CO2 SERPL-SCNC: 30 MMOL/L (ref 22–30)
EOSINOPHIL NFR BLD MANUAL: 3 % (ref 0–6)
ERYTHROCYTE [DISTWIDTH] IN BLOOD BY AUTOMATED COUNT: 16.1 % (ref 11.5–14)
GLUCOSE SERPL-MCNC: 132 MG/DL (ref 75–110)
HCT VFR BLD CALC: 33.3 % (ref 37.9–51)
HGB BLD-MCNC: 10.5 G/DL (ref 13.5–17)
MCH RBC QN AUTO: 20.4 PG (ref 27–33.4)
MCHC RBC AUTO-ENTMCNC: 31.6 G/DL (ref 32–36)
MCV RBC AUTO: 65 FL (ref 80–97)
MONOCYTES % (MANUAL): 3 % (ref 3–13)
PHOSPHATE SERPL-MCNC: 3.7 MG/DL (ref 2.5–4.5)
PLATELET # BLD: 270 10^3/UL (ref 150–450)
PLATELET COMMENT: ADEQUATE
POTASSIUM SERPL-SCNC: 3.7 MMOL/L (ref 3.6–5)
RBC # BLD AUTO: 5.16 10^6/UL (ref 4.35–5.55)
SEGMENTED NEUTROPHILS % (MAN): 78 % (ref 42–78)
SODIUM SERPL-SCNC: 137.4 MMOL/L (ref 137–145)
TARGETS BLD QL SMEAR: (no result)
TOTAL CELLS COUNTED BLD: 100
VANCOMYCIN,TROUGH: 9.9 UG/ML (ref 5–20)
VARIANT LYMPHS NFR BLD MANUAL: 14 % (ref 13–45)
WBC # BLD AUTO: 7.4 10^3/UL (ref 4–10.5)

## 2018-05-16 RX ADMIN — METOPROLOL TARTRATE SCH MG: 100 TABLET, FILM COATED ORAL at 21:23

## 2018-05-16 RX ADMIN — HEPARIN SODIUM SCH UNIT: 5000 INJECTION, SOLUTION INTRAVENOUS; SUBCUTANEOUS at 15:52

## 2018-05-16 RX ADMIN — LISINOPRIL SCH MG: 10 TABLET ORAL at 11:57

## 2018-05-16 RX ADMIN — HEPARIN SODIUM SCH UNIT: 5000 INJECTION, SOLUTION INTRAVENOUS; SUBCUTANEOUS at 21:23

## 2018-05-16 RX ADMIN — VANCOMYCIN HYDROCHLORIDE SCH MG: 1 INJECTION, POWDER, LYOPHILIZED, FOR SOLUTION INTRAVENOUS at 22:40

## 2018-05-16 RX ADMIN — VANCOMYCIN HYDROCHLORIDE SCH MG: 1 INJECTION, POWDER, LYOPHILIZED, FOR SOLUTION INTRAVENOUS at 17:44

## 2018-05-16 RX ADMIN — METOPROLOL TARTRATE SCH MG: 100 TABLET, FILM COATED ORAL at 11:56

## 2018-05-16 RX ADMIN — AMLODIPINE BESYLATE SCH MG: 10 TABLET ORAL at 11:57

## 2018-05-16 RX ADMIN — FAMOTIDINE SCH MG: 20 TABLET, FILM COATED ORAL at 21:23

## 2018-05-16 RX ADMIN — FAMOTIDINE SCH MG: 20 TABLET, FILM COATED ORAL at 11:56

## 2018-05-16 RX ADMIN — CLONIDINE HYDROCHLORIDE SCH MG: 0.1 TABLET ORAL at 21:23

## 2018-05-16 RX ADMIN — ACETAMINOPHEN PRN MG: 325 TABLET ORAL at 21:23

## 2018-05-16 RX ADMIN — AMPICILLIN SODIUM AND SULBACTAM SODIUM SCH GM: 2; 1 INJECTION, POWDER, FOR SOLUTION INTRAMUSCULAR; INTRAVENOUS at 04:11

## 2018-05-16 RX ADMIN — CLONIDINE HYDROCHLORIDE SCH MG: 0.1 TABLET ORAL at 15:52

## 2018-05-16 RX ADMIN — CLONIDINE HYDROCHLORIDE SCH MG: 0.1 TABLET ORAL at 06:31

## 2018-05-16 RX ADMIN — IRON SUCROSE SCH MG: 20 INJECTION, SOLUTION INTRAVENOUS at 11:54

## 2018-05-16 RX ADMIN — AMPICILLIN SODIUM AND SULBACTAM SODIUM SCH GM: 2; 1 INJECTION, POWDER, FOR SOLUTION INTRAMUSCULAR; INTRAVENOUS at 08:24

## 2018-05-16 RX ADMIN — AMPICILLIN SODIUM AND SULBACTAM SODIUM SCH GM: 2; 1 INJECTION, POWDER, FOR SOLUTION INTRAMUSCULAR; INTRAVENOUS at 15:53

## 2018-05-16 RX ADMIN — VANCOMYCIN HYDROCHLORIDE SCH MG: 1 INJECTION, POWDER, LYOPHILIZED, FOR SOLUTION INTRAVENOUS at 06:31

## 2018-05-16 RX ADMIN — HEPARIN SODIUM SCH UNIT: 5000 INJECTION, SOLUTION INTRAVENOUS; SUBCUTANEOUS at 06:31

## 2018-05-16 RX ADMIN — AMPICILLIN SODIUM AND SULBACTAM SODIUM SCH GM: 2; 1 INJECTION, POWDER, FOR SOLUTION INTRAMUSCULAR; INTRAVENOUS at 21:23

## 2018-05-16 RX ADMIN — ACETAMINOPHEN PRN MG: 325 TABLET ORAL at 06:34

## 2018-05-16 NOTE — PDOC PROGRESS REPORT
Subjective


Progress Note for:: 05/16/18


Subjective:: 





No current complaints, just tired today.  Diarrhea has mostly resolved.





T-max 101.4 at 4:00 this morning


Reason For Visit: 


ABD PAIN ILEUS,GASTROENTRITIS,HTN URGENCY








Physical Exam


Vital Signs: 


 











Temp Pulse Resp BP Pulse Ox


 


 101.4 F H  81   16   168/84 H  97 


 


 05/16/18 04:08  05/16/18 07:00  05/16/18 04:08  05/16/18 04:08  05/16/18 04:08








 Intake & Output











 05/15/18 05/16/18 05/17/18





 05:59 05:59 05:59


 


Intake Total 3042 2366 700


 


Output Total 2695 5575 1600


 


Balance 567 -9669 -900


 


Weight 240 lb 11.916 oz  











General appearance: PRESENT: no acute distress, obese


Respiratory exam: PRESENT: clear to auscultation renetta


Cardiovascular exam: PRESENT: RRR


GI/Abdominal exam: PRESENT: soft.  ABSENT: tenderness


Neurological exam: PRESENT: alert


Psychiatric exam: PRESENT: appropriate affect


Skin exam: PRESENT: warm





Results


Laboratory Results: 


 





 05/16/18 08:13 





 05/16/18 08:13 





 











  05/16/18 05/16/18





  08:13 08:13


 


WBC  7.4 


 


RBC  5.16 


 


Hgb  10.5 L 


 


Hct  33.3 L 


 


MCV  65 L 


 


MCH  20.4 L 


 


MCHC  31.6 L 


 


RDW  16.1 H 


 


Plt Count  270 


 


Seg Neutrophils %  Not Reportable 


 


Lymphocytes %  Not Reportable 


 


Monocytes %  Not Reportable 


 


Eosinophils %  Not Reportable 


 


Basophils %  Not Reportable 


 


Absolute Neutrophils  Not Reportable 


 


Absolute Lymphocytes  Not Reportable 


 


Absolute Monocytes  Not Reportable 


 


Absolute Eosinophils  Not Reportable 


 


Absolute Basophils  Not Reportable 


 


Sodium   137.4


 


Potassium   3.7


 


Chloride   97 L


 


Carbon Dioxide   30


 


Anion Gap   10


 


BUN   13


 


Creatinine   0.97


 


Est GFR ( Amer)   > 60


 


Est GFR (Non-Af Amer)   > 60


 


Glucose   132 H


 


Calcium   8.2 L


 


Phosphorus   3.7


 


Albumin   3.0 L








 





05/13/18 08:25   Stool - Stool    - Final


05/13/18 08:25   Stool - Stool   Stool Culture - Final


                            NO SALMONELLA, SHIGELLA, CAMPYLOBACTER, OR E.COLI 

[id number]


                            RECOVERED.


                            NEGATIVE FOR SHIGA TOXINS 1&2.





 











  05/15/18





  05:48


 


NT-Pro-B Natriuret Pep  583 H











Impressions: 


 





Abdomen X-Ray  05/13/18 00:07


IMPRESSION:


 


Small bowel ileus or low-grade obstruction.


 














Assessment & Plan





- Diagnosis


(1) Enterocolitis


Is this a current diagnosis for this admission?: Yes   


Plan: 


C. difficile was negative.  Diarrhea has mostly resolved, however, his fevers 

have not resolved.  I am concerned there may be something else going on here.  

Partially because his fevers have not improved, and partially because of his 

unexplained microcytic anemia.  At some point he will need a colonoscopy to 

further investigate his anemia, but I would prefer to do this as an outpatient 

after his acute episode has resolved.








(2) Sepsis


Qualifiers: 


   Sepsis type: sepsis due to unspecified organism   Qualified Code(s): A41.9 - 

Sepsis, unspecified organism   


Is this a current diagnosis for this admission?: Yes   


Plan: 


Presumably the same organism as his colitis.  Cultures are negative thus far.  

Fevers have not improved.  I broadened antibiotic coverage yesterday.  White 

count continues to improve








(3) HTN (hypertension)


Qualifiers: 


   Hypertension type: essential hypertension   Qualified Code(s): I10 - 

Essential (primary) hypertension   


Is this a current diagnosis for this admission?: Yes   


Plan: 


Not on any home medication.  Quite severe.  Continue to titrate 

antihypertensives for optimal control.








(4) Obesity


Qualifiers: 


   Obesity type: due to excess calories   Body mass index: BMI 34.0-34.9 


Is this a current diagnosis for this admission?: Yes   





(5) Microcytic anemia


Is this a current diagnosis for this admission?: Yes   


Plan: 


Unusual in a man this age.  Guaiac stool.  Iron deficient: Replace.  Will need 

a colonoscopy at some point, though probably not as an inpatient.

## 2018-05-17 LAB
%HYPO/RBC NFR BLD AUTO: SLIGHT %
ADD MANUAL DIFF: (no result)
ALBUMIN SERPL-MCNC: 3.1 G/DL (ref 3.5–5)
ALP SERPL-CCNC: 91 U/L (ref 38–126)
ALT SERPL-CCNC: 79 U/L (ref 21–72)
ANION GAP SERPL CALC-SCNC: 11 MMOL/L (ref 5–19)
ANISOCYTOSIS BLD QL SMEAR: (no result)
AST SERPL-CCNC: 173 U/L (ref 17–59)
BASOPHILS # BLD AUTO: 0.1 10^3/UL (ref 0–0.2)
BASOPHILS NFR BLD AUTO: 1 % (ref 0–2)
BILIRUB DIRECT SERPL-MCNC: 0.4 MG/DL (ref 0–0.4)
BILIRUB SERPL-MCNC: 0.5 MG/DL (ref 0.2–1.3)
BUN SERPL-MCNC: 14 MG/DL (ref 7–20)
CALCIUM: 8.6 MG/DL (ref 8.4–10.2)
CHLORIDE SERPL-SCNC: 101 MMOL/L (ref 98–107)
CO2 SERPL-SCNC: 29 MMOL/L (ref 22–30)
EOSINOPHIL # BLD AUTO: 0.7 10^3/UL (ref 0–0.6)
EOSINOPHIL NFR BLD AUTO: 8.3 % (ref 0–6)
ERYTHROCYTE [DISTWIDTH] IN BLOOD BY AUTOMATED COUNT: 16.1 % (ref 11.5–14)
GLUCOSE SERPL-MCNC: 186 MG/DL (ref 75–110)
HCT VFR BLD CALC: 34.3 % (ref 37.9–51)
HGB BLD-MCNC: 11 G/DL (ref 13.5–17)
LYMPHOCYTES # BLD AUTO: 1.3 10^3/UL (ref 0.5–4.7)
LYMPHOCYTES NFR BLD AUTO: 15.6 % (ref 13–45)
MCH RBC QN AUTO: 20.3 PG (ref 27–33.4)
MCHC RBC AUTO-ENTMCNC: 32.1 G/DL (ref 32–36)
MCV RBC AUTO: 63 FL (ref 80–97)
MONOCYTES # BLD AUTO: 0.6 10^3/UL (ref 0.1–1.4)
MONOCYTES NFR BLD AUTO: 7.3 % (ref 3–13)
NEUTROPHILS # BLD AUTO: 5.8 10^3/UL (ref 1.7–8.2)
NEUTS SEG NFR BLD AUTO: 67.8 % (ref 42–78)
OVALOCYTES BLD QL SMEAR: SLIGHT
PHOSPHATE SERPL-MCNC: 3.5 MG/DL (ref 2.5–4.5)
PLATELET # BLD: 322 10^3/UL (ref 150–450)
PLATELET COMMENT: ADEQUATE
POIKILOCYTOSIS BLD QL SMEAR: (no result)
POTASSIUM SERPL-SCNC: 3.6 MMOL/L (ref 3.6–5)
PROT SERPL-MCNC: 6.4 G/DL (ref 6.3–8.2)
RBC # BLD AUTO: 5.43 10^6/UL (ref 4.35–5.55)
SODIUM SERPL-SCNC: 141.4 MMOL/L (ref 137–145)
TARGETS BLD QL SMEAR: (no result)
TOTAL CELLS COUNTED % (AUTO): 100 %
WBC # BLD AUTO: 8.5 10^3/UL (ref 4–10.5)

## 2018-05-17 RX ADMIN — METOPROLOL TARTRATE SCH MG: 100 TABLET, FILM COATED ORAL at 10:30

## 2018-05-17 RX ADMIN — HEPARIN SODIUM SCH UNIT: 5000 INJECTION, SOLUTION INTRAVENOUS; SUBCUTANEOUS at 05:26

## 2018-05-17 RX ADMIN — CLONIDINE HYDROCHLORIDE SCH MG: 0.1 TABLET ORAL at 21:48

## 2018-05-17 RX ADMIN — DOXYCYCLINE HYCLATE SCH MG: 100 TABLET ORAL at 10:30

## 2018-05-17 RX ADMIN — CIPROFLOXACIN HYDROCHLORIDE SCH MG: 500 TABLET, FILM COATED ORAL at 21:48

## 2018-05-17 RX ADMIN — DOXYCYCLINE HYCLATE SCH MG: 100 TABLET ORAL at 21:48

## 2018-05-17 RX ADMIN — METOPROLOL TARTRATE SCH MG: 100 TABLET, FILM COATED ORAL at 21:48

## 2018-05-17 RX ADMIN — AMPICILLIN SODIUM AND SULBACTAM SODIUM SCH GM: 2; 1 INJECTION, POWDER, FOR SOLUTION INTRAMUSCULAR; INTRAVENOUS at 03:12

## 2018-05-17 RX ADMIN — FAMOTIDINE SCH MG: 20 TABLET, FILM COATED ORAL at 21:48

## 2018-05-17 RX ADMIN — CIPROFLOXACIN HYDROCHLORIDE SCH MG: 500 TABLET, FILM COATED ORAL at 10:29

## 2018-05-17 RX ADMIN — LISINOPRIL SCH MG: 10 TABLET ORAL at 10:29

## 2018-05-17 RX ADMIN — FAMOTIDINE SCH MG: 20 TABLET, FILM COATED ORAL at 10:28

## 2018-05-17 RX ADMIN — CLONIDINE HYDROCHLORIDE SCH MG: 0.1 TABLET ORAL at 05:26

## 2018-05-17 RX ADMIN — VANCOMYCIN HYDROCHLORIDE SCH MG: 1 INJECTION, POWDER, LYOPHILIZED, FOR SOLUTION INTRAVENOUS at 06:00

## 2018-05-17 RX ADMIN — HYDRALAZINE HYDROCHLORIDE PRN MG: 20 INJECTION INTRAMUSCULAR; INTRAVENOUS at 00:27

## 2018-05-17 RX ADMIN — IRON SUCROSE SCH MG: 20 INJECTION, SOLUTION INTRAVENOUS at 10:32

## 2018-05-17 RX ADMIN — HEPARIN SODIUM SCH UNIT: 5000 INJECTION, SOLUTION INTRAVENOUS; SUBCUTANEOUS at 14:55

## 2018-05-17 RX ADMIN — HEPARIN SODIUM SCH UNIT: 5000 INJECTION, SOLUTION INTRAVENOUS; SUBCUTANEOUS at 21:48

## 2018-05-17 RX ADMIN — CLONIDINE HYDROCHLORIDE SCH MG: 0.1 TABLET ORAL at 14:54

## 2018-05-17 RX ADMIN — AMLODIPINE BESYLATE SCH MG: 10 TABLET ORAL at 10:31

## 2018-05-17 NOTE — PDOC PROGRESS REPORT
Subjective


Progress Note for:: 05/17/18


Subjective:: 





Still has loose stool abdominal pain improving no nausea or vomiting tolerating 

p.o. intake, T-max at 4 .4 this morning 99.7


Reason For Visit: 


ABD PAIN ILEUS,GASTROENTRITIS,HTN URGENCY








Physical Exam


Vital Signs: 


 











Temp Pulse Resp BP Pulse Ox


 


 99.5 F   67   18   143/89 H  99 


 


 05/17/18 11:20  05/17/18 11:20  05/17/18 11:20  05/17/18 11:20  05/17/18 11:20








 Intake & Output











 05/16/18 05/17/18 05/18/18





 06:59 06:59 06:59


 


Intake Total 2566 3401 514


 


Output Total 3925 2625 1000


 


Balance -1359 776 -486


 


Weight  104.9 kg 











General appearance: PRESENT: no acute distress, well-developed, well-nourished





Results


Laboratory Results: 


 





 05/17/18 06:14 





 05/17/18 06:14 





 











  05/17/18 05/17/18





  06:14 06:14


 


WBC  8.5 


 


RBC  5.43 


 


Hgb  11.0 L 


 


Hct  34.3 L 


 


MCV  63 L 


 


MCH  20.3 L 


 


MCHC  32.1 


 


RDW  16.1 H 


 


Plt Count  322 


 


Seg Neutrophils %  67.8 


 


Lymphocytes %  15.6 


 


Monocytes %  7.3 


 


Eosinophils %  8.3 H 


 


Basophils %  1.0 


 


Absolute Neutrophils  5.8 


 


Absolute Lymphocytes  1.3 


 


Absolute Monocytes  0.6 


 


Absolute Eosinophils  0.7 H 


 


Absolute Basophils  0.1 


 


Sodium   141.4


 


Potassium   3.6


 


Chloride   101


 


Carbon Dioxide   29


 


Anion Gap   11


 


BUN   14


 


Creatinine   0.95


 


Est GFR ( Amer)   > 60


 


Est GFR (Non-Af Amer)   > 60


 


Glucose   186 H


 


Calcium   8.6


 


Phosphorus   3.5


 


Magnesium   2.0


 


Total Bilirubin   0.5


 


AST   173 H


 


ALT   79 H


 


Alkaline Phosphatase   91


 


Total Protein   6.4


 


Albumin   3.1 L








 











  05/15/18





  05:48


 


NT-Pro-B Natriuret Pep  583 H











Impressions: 


 





Abdomen X-Ray  05/13/18 00:07


IMPRESSION:


 


Small bowel ileus or low-grade obstruction.


 














Assessment & Plan





- Plan Summary


Plan Summary: 





(1) Enterocolitis


Is this a current diagnosis for this admission?: Yes   


Plan: 


C. difficile was negative.  Culture does show significant growth , patient 

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 

vulnificus, change antibiotic to Cipro and doxycycline, if diarrhea improves 

discharge planning for tomorrow





(2) Sepsis


Qualifiers: 


   Sepsis type: sepsis due to unspecified organism   Qualified Code(s): A41.9 - 

Sepsis, unspecified organism   


Is this a current diagnosis for this admission?: Yes   


Plan: 


Presumably the same organism as his colitis.  Blood pressure stable sepsis has 

resolved





(3) HTN (hypertension)


Qualifiers: 


   Hypertension type: essential hypertension   Qualified Code(s): I10 - 

Essential (primary) hypertension   


Is this a current diagnosis for this admission?: Yes   


Plan: 


Not on any home medication.  Quite severe.  Blood pressure is improving 

continue current treatment will need prescription on discharge





(4) Obesity


Qualifiers: 


   Obesity type: due to excess calories   Body mass index: BMI 34.0-34.9 


Is this a current diagnosis for this admission?: Yes   





(5) Microcytic anemia


Is this a current diagnosis for this admission?: Yes   


Plan: 


Unusual in a man this age.  Guaiac stool.  Iron deficient: Replace.  Will need 

a colonoscopy at some point, though probably not as an inpatient.

## 2018-05-18 VITALS — SYSTOLIC BLOOD PRESSURE: 138 MMHG | DIASTOLIC BLOOD PRESSURE: 62 MMHG

## 2018-05-18 RX ADMIN — HEPARIN SODIUM SCH UNIT: 5000 INJECTION, SOLUTION INTRAVENOUS; SUBCUTANEOUS at 06:15

## 2018-05-18 RX ADMIN — DOXYCYCLINE HYCLATE SCH MG: 100 TABLET ORAL at 10:32

## 2018-05-18 RX ADMIN — AMLODIPINE BESYLATE SCH MG: 10 TABLET ORAL at 10:31

## 2018-05-18 RX ADMIN — CIPROFLOXACIN HYDROCHLORIDE SCH MG: 500 TABLET, FILM COATED ORAL at 10:32

## 2018-05-18 RX ADMIN — METOPROLOL TARTRATE SCH MG: 100 TABLET, FILM COATED ORAL at 10:32

## 2018-05-18 RX ADMIN — FAMOTIDINE SCH MG: 20 TABLET, FILM COATED ORAL at 10:32

## 2018-05-18 RX ADMIN — LISINOPRIL SCH MG: 10 TABLET ORAL at 10:33

## 2018-05-18 RX ADMIN — CLONIDINE HYDROCHLORIDE SCH MG: 0.1 TABLET ORAL at 06:15

## 2018-05-18 NOTE — PDOC DISCHARGE SUMMARY
General





- Admit/Disc Date/PCP


Admission Date/Primary Care Provider: 


  05/13/18 02:52





  





Discharge Date: 05/18/18





- Discharge Diagnosis


(1) Bandemia


Is this a current diagnosis for this admission?: Yes   





(2) Dehydration


Is this a current diagnosis for this admission?: Yes   





(3) Enterocolitis


Is this a current diagnosis for this admission?: Yes   





(4) HTN (hypertension)


Is this a current diagnosis for this admission?: Yes   





(5) Microcytic anemia


Is this a current diagnosis for this admission?: Yes   





(6) Obesity


Is this a current diagnosis for this admission?: Yes   





(7) Sepsis


Is this a current diagnosis for this admission?: Yes   





(8) Hematuria


Is this a current diagnosis for this admission?: Yes   


Summary: 


Large blood in urine but 0 wbc. Follow up suggested








- Additional Information


Resuscitation Status: Full Code


Discharge Diet: Cardiac


Discharge Activity: Activity As Tolerated


Prescriptions: 


Amlodipine Besylate [Norvasc 10 mg Tablet] 10 mg PO DAILY 30 Days #30 tablet


Ciprofloxacin HCl [Cipro 500 mg Tablet] 500 mg PO Q12 5 Days #10 tablet


Clonidine HCl [Catapres 0.1 mg Tablet] 0.2 mg PO Q8 30 Days #100 tablet


Doxycycline Hyclate [Vibramycin 100 mg Tablet] 100 mg PO Q12 5 Days #10 tablet


Lisinopril [Prinivil 10 mg Tablet] 40 mg PO DAILY 30 Days #30 tablet


Metoprolol Tartrate [Lopressor 100 mg Tablet] 100 mg PO Q12 30 Days #60 tablet


Home Medications: 








Amlodipine Besylate [Norvasc 10 mg Tablet] 10 mg PO DAILY 30 Days #30 tablet 05/ 18/18 


Ciprofloxacin HCl [Cipro 500 mg Tablet] 500 mg PO Q12 5 Days #10 tablet 05/18/ 18 


Clonidine HCl [Catapres 0.1 mg Tablet] 0.2 mg PO Q8 30 Days #100 tablet 05/18/ 18 


Doxycycline Hyclate [Vibramycin 100 mg Tablet] 100 mg PO Q12 5 Days #10 tablet 

05/18/18 


Lisinopril [Prinivil 10 mg Tablet] 40 mg PO DAILY 30 Days #30 tablet 05/18/18 


Metoprolol Tartrate [Lopressor 100 mg Tablet] 100 mg PO Q12 30 Days #60 tablet 

05/18/18 











History of Present Illness


History of Present Illness: 


MARIBELL MULLEN is a 47 year old male








Hospital Course


Hospital Course: 





Patient was admitted with abdominal pain, nausea, vomiting and diarrhea which 

started after a meal.  He was thought to have a possible gastroenteritis 

although his cultures were negative.  Patient was also found to be in 

hypertensive crisis with a blood pressure as high as 186/101.  Patient was 

started on empiric antibiotics and has gradually improved while in hospital.  

Patient has also become better controlled with his current antihypertensive 

regimen.  Patient has continued to clinically improve been hemodynamically 

stable.  With no further interventions been planned and with hemodynamic 

stability he has been discharged home for outpatient follow-up.  Was found to 

have iron deficiency anemia and he received intravenous iron transfusion.  He 

has been advised to follow-up with his private care physician for referral to 

gastroenterologist for colonoscopy as outpatient as her iron still was found to 

be 28.9.  Patient presented also with hematuria although his red blood cell 

count was 0.  I suggest this patient for the outpatient evaluation.  With no 

further interventions been planned patient is been discharged





Physical Exam


Vital Signs: 


 











Temp Pulse Resp BP Pulse Ox


 


 98.5 F   60   15   120/76   94 


 


 05/18/18 07:37  05/18/18 07:37  05/18/18 07:37  05/18/18 07:37  05/18/18 07:37








 Intake & Output











 05/17/18 05/18/18 05/19/18





 06:59 06:59 06:59


 


Intake Total 3401 1314 


 


Output Total 2625 1450 


 


Balance 776 -136 


 


Weight 104.9 kg 105.5 kg 











General appearance: PRESENT: no acute distress, well-developed, well-nourished


Head exam: PRESENT: atraumatic, normocephalic


Eye exam: PRESENT: conjunctiva pink, EOMI, PERRLA.  ABSENT: scleral icterus


Ear exam: PRESENT: normal external ear exam


Mouth exam: PRESENT: moist, tongue midline


Neck exam: ABSENT: carotid bruit, JVD, lymphadenopathy, thyromegaly


Respiratory exam: PRESENT: clear to auscultation renetta.  ABSENT: rales, rhonchi, 

wheezes


Cardiovascular exam: PRESENT: RRR.  ABSENT: diastolic murmur, rubs, systolic 

murmur


Pulses: PRESENT: normal dorsalis pedis pul


Vascular exam: PRESENT: normal capillary refill


GI/Abdominal exam: PRESENT: normal bowel sounds, soft.  ABSENT: distended, 

guarding, mass, organolmegaly, rebound, tenderness


Rectal exam: PRESENT: deferred


Extremities exam: PRESENT: full ROM.  ABSENT: calf tenderness, clubbing, pedal 

edema


Neurological exam: PRESENT: alert, awake, oriented to person, oriented to place

, oriented to time, oriented to situation, CN II-XII grossly intact.  ABSENT: 

motor sensory deficit


Psychiatric exam: PRESENT: appropriate affect, normal mood.  ABSENT: homicidal 

ideation, suicidal ideation


Skin exam: PRESENT: dry, intact, warm.  ABSENT: cyanosis, rash





Results


Laboratory Results: 


 





 05/17/18 06:14 





 05/17/18 06:14 





 











  05/15/18





  05:48


 


NT-Pro-B Natriuret Pep  583 H











Impressions: 


 





Abdomen X-Ray  05/13/18 00:07


IMPRESSION:


 


Small bowel ileus or low-grade obstruction.


 














Qualifiers





- *


PATIENT BEING DISCHARGED WITH ANY OF THE FOLLOWING DIAGNOSIS: No





Plan


Time Spent: Greater than 30 Minutes

## 2020-08-25 NOTE — PDOC H&P
History of Present Illness


Admission Date/PCP: 


  05/13/18 02:52





  





Patient complains of: Nausea vomiting diarrhea


History of Present Illness: 


MARIBELL MULLEN is a 47 year old male without past medical history who presents 

with 6 days of abdominal pain with intermittent nausea vomiting and diarrhea.  

Patient's symptoms began after a suspect meal.  He denies blood in vomit or 

bowel movement.  He has had subjective fever.  He denies previous episode in 

the emergency room he receives symptomatic management and labs reveal 

leukocytosis with bandemia, abdominal series reveals ileus versus early small 

bowel obstruction.  Patient has bowel sounds and flatus.  He is referred to the 

hospitalist for admission.








Past Medical History


Medical History: None





Past Surgical History


Past Surgical History: Reports: None





Social History


Information Source: Patient


Lives with: Family


Smoking Status: Never Smoker


Frequency of Alcohol Use: None


Drugs: None





- Advance Directive


Resuscitation Status: Full Code





Family History


Family History: Hypertension


Parental Family History Reviewed: Yes


Children Family History Reviewed: Yes


Sibling(s) Family History Reviewed.: Yes





Medication/Allergy


Allergies/Adverse Reactions: 


 





No Known Allergies Allergy (Verified 05/12/18 23:01)


 











Review of Systems


Constitutional: PRESENT: as per HPI, anorexia, chills, fatigue, fever(s).  

ABSENT: headache(s), night sweats, weakness


Eyes: ABSENT: visual disturbances


Ears: ABSENT: hearing changes


Cardiovascular: ABSENT: chest pain, dyspnea on exertion, edema, orthropnea, 

palpitations


Respiratory: ABSENT: cough, hemoptysis


Gastrointestinal: PRESENT: as per HPI, abdominal pain, bloating, diarrhea, 

nausea, vomiting.  ABSENT: coffee ground emesis, constipation, melena


Genitourinary: ABSENT: dysuria, hematuria


Musculoskeletal: ABSENT: joint swelling


Integumentary: ABSENT: rash, wounds


Neurological: ABSENT: abnormal gait, abnormal speech, confusion, dizziness, 

focal weakness, syncope


Psychiatric: ABSENT: anxiety, depression, homidical ideation, suicidal ideation


Endocrine: ABSENT: cold intolerance, heat intolerance, polydipsia, polyuria


Hematologic/Lymphatic: ABSENT: easy bleeding, easy bruising





Physical Exam


Vital Signs: 


 











Temp Pulse Resp BP Pulse Ox


 


 103.2 F H  102 H  25 H  160/87 H  96 


 


 05/12/18 23:09  05/12/18 23:09  05/13/18 00:31  05/13/18 04:01  05/13/18 05:01











General appearance: PRESENT: cooperative, mild distress.  ABSENT: hard of 

hearing, obese


Head exam: PRESENT: atraumatic, normocephalic


Eye exam: PRESENT: conjunctiva pink, EOMI, PERRLA.  ABSENT: scleral icterus


Ear exam: PRESENT: normal external ear exam


Mouth exam: PRESENT: moist, tongue midline


Neck exam: ABSENT: carotid bruit, JVD, lymphadenopathy, thyromegaly


Respiratory exam: PRESENT: clear to auscultation renetta.  ABSENT: rales, rhonchi, 

wheezes


Cardiovascular exam: PRESENT: RRR.  ABSENT: diastolic murmur, rubs, systolic 

murmur


Pulses: PRESENT: normal dorsalis pedis pul


Vascular exam: PRESENT: normal capillary refill


GI/Abdominal exam: PRESENT: normal bowel sounds, soft, tenderness.  ABSENT: 

distended, firm, guarding, hernia, mass, organolmegaly, rebound, rigid


Rectal exam: PRESENT: deferred


Extremities exam: PRESENT: full ROM.  ABSENT: calf tenderness, clubbing, pedal 

edema


Neurological exam: PRESENT: alert, awake, oriented to person, oriented to place

, oriented to time, oriented to situation, CN II-XII grossly intact.  ABSENT: 

motor sensory deficit


Psychiatric exam: PRESENT: appropriate affect, normal mood.  ABSENT: homicidal 

ideation, suicidal ideation


Skin exam: PRESENT: dry, intact, warm.  ABSENT: cyanosis, rash





Results


Laboratory Results: 


 





 05/13/18 03:13 





 05/13/18 03:13 





 











  05/13/18 05/13/18 05/13/18





  03:13 03:13 03:13


 


WBC  14.0 H  


 


RBC  5.34  


 


Hgb  11.0 L  


 


Hct  33.7 L  


 


MCV  63 L  


 


MCH  20.5 L  


 


MCHC  32.6  


 


RDW  15.5 H  


 


Plt Count  150  


 


Seg Neutrophils %  Not Reportable  


 


Lymphocytes %  Not Reportable  


 


Monocytes %  Not Reportable  


 


Eosinophils %  Not Reportable  


 


Basophils %  Not Reportable  


 


Absolute Neutrophils  Not Reportable  


 


Absolute Lymphocytes  Not Reportable  


 


Absolute Monocytes  Not Reportable  


 


Absolute Eosinophils  Not Reportable  


 


Absolute Basophils  Not Reportable  


 


Sodium   135.5 L 


 


Potassium   3.0 L* 


 


Chloride   92 L 


 


Carbon Dioxide   32 H 


 


Anion Gap   12 


 


BUN   12 


 


Creatinine   1.23 


 


Est GFR ( Amer)   > 60 


 


Est GFR (Non-Af Amer)   > 60 


 


Glucose   132 H 


 


Lactic Acid    1.1


 


Calcium   7.8 L 


 


Total Bilirubin   0.9 


 


AST   225 H 


 


ALT   87 H 


 


Alkaline Phosphatase   74 


 


Total Protein   7.3 


 


Albumin   3.4 L 











Impressions: 


 





Abdomen X-Ray  05/13/18 00:07


IMPRESSION:


 


Small bowel ileus or low-grade obstruction.


 














Assessment & Plan





- Diagnosis


(1) Enterocolitis


Is this a current diagnosis for this admission?: Yes   


Plan: 


Symptomatic management, empiric Flagyl and Cipro, IV fluid challenge follow-up 

CBC and stool studies.








(2) Bandemia


Is this a current diagnosis for this admission?: Yes   


Plan: 


Empiric antibiotics initiated C. difficile.








(3) Dehydration


Is this a current diagnosis for this admission?: Yes   


Plan: 


IV fluid challenge reevaluate chemistry








(4) Sepsis


Qualifiers: 


   Sepsis type: sepsis due to unspecified organism   Qualified Code(s): A41.9 - 

Sepsis, unspecified organism   


Is this a current diagnosis for this admission?: Yes   


Plan: 


Secondary to #1, follow-up chemistry and CBC.








- Time


Time Spent: 30 to 50 Minutes





- Inpatient Certification


Medical Necessity: Need Close Monitoring Due to Risk of Patient Decompensation
no